# Patient Record
Sex: FEMALE | Race: WHITE | NOT HISPANIC OR LATINO | URBAN - METROPOLITAN AREA
[De-identification: names, ages, dates, MRNs, and addresses within clinical notes are randomized per-mention and may not be internally consistent; named-entity substitution may affect disease eponyms.]

---

## 2017-03-29 ENCOUNTER — OUTPATIENT (OUTPATIENT)
Dept: OUTPATIENT SERVICES | Facility: HOSPITAL | Age: 82
LOS: 1 days | End: 2017-03-29
Payer: MEDICARE

## 2017-03-29 DIAGNOSIS — Z95.1 PRESENCE OF AORTOCORONARY BYPASS GRAFT: Chronic | ICD-10-CM

## 2017-03-29 DIAGNOSIS — Z95.4 PRESENCE OF OTHER HEART-VALVE REPLACEMENT: Chronic | ICD-10-CM

## 2017-03-29 DIAGNOSIS — Z98.89 OTHER SPECIFIED POSTPROCEDURAL STATES: Chronic | ICD-10-CM

## 2017-03-29 DIAGNOSIS — Z98.49 CATARACT EXTRACTION STATUS, UNSPECIFIED EYE: Chronic | ICD-10-CM

## 2017-03-29 DIAGNOSIS — H26.492 OTHER SECONDARY CATARACT, LEFT EYE: ICD-10-CM

## 2017-03-29 PROCEDURE — 66821 AFTER CATARACT LASER SURGERY: CPT | Mod: LT

## 2017-04-20 ENCOUNTER — APPOINTMENT (OUTPATIENT)
Dept: CARDIOLOGY | Facility: CLINIC | Age: 82
End: 2017-04-20

## 2017-04-20 ENCOUNTER — NON-APPOINTMENT (OUTPATIENT)
Age: 82
End: 2017-04-20

## 2017-04-20 VITALS
DIASTOLIC BLOOD PRESSURE: 96 MMHG | HEART RATE: 69 BPM | HEIGHT: 62.5 IN | SYSTOLIC BLOOD PRESSURE: 191 MMHG | OXYGEN SATURATION: 96 %

## 2017-05-07 ENCOUNTER — RESULT CHARGE (OUTPATIENT)
Age: 82
End: 2017-05-07

## 2017-05-08 ENCOUNTER — APPOINTMENT (OUTPATIENT)
Dept: INTERNAL MEDICINE | Facility: CLINIC | Age: 82
End: 2017-05-08

## 2017-05-08 ENCOUNTER — INPATIENT (INPATIENT)
Facility: HOSPITAL | Age: 82
LOS: 1 days | Discharge: HOME | DRG: 312 | End: 2017-05-10
Attending: HOSPITALIST | Admitting: HOSPITALIST
Payer: MEDICARE

## 2017-05-08 ENCOUNTER — NON-APPOINTMENT (OUTPATIENT)
Age: 82
End: 2017-05-08

## 2017-05-08 VITALS — HEART RATE: 70 BPM | DIASTOLIC BLOOD PRESSURE: 92 MMHG | SYSTOLIC BLOOD PRESSURE: 170 MMHG | RESPIRATION RATE: 14 BRPM

## 2017-05-08 VITALS
DIASTOLIC BLOOD PRESSURE: 90 MMHG | HEART RATE: 86 BPM | WEIGHT: 194 LBS | HEIGHT: 62.5 IN | SYSTOLIC BLOOD PRESSURE: 130 MMHG | BODY MASS INDEX: 34.81 KG/M2

## 2017-05-08 VITALS
RESPIRATION RATE: 18 BRPM | TEMPERATURE: 98 F | SYSTOLIC BLOOD PRESSURE: 181 MMHG | OXYGEN SATURATION: 98 % | HEART RATE: 92 BPM | DIASTOLIC BLOOD PRESSURE: 79 MMHG

## 2017-05-08 DIAGNOSIS — Z98.89 OTHER SPECIFIED POSTPROCEDURAL STATES: Chronic | ICD-10-CM

## 2017-05-08 DIAGNOSIS — I82.409 ACUTE EMBOLISM AND THROMBOSIS OF UNSPECIFIED DEEP VEINS OF UNSPECIFIED LOWER EXTREMITY: ICD-10-CM

## 2017-05-08 DIAGNOSIS — Z95.1 PRESENCE OF AORTOCORONARY BYPASS GRAFT: Chronic | ICD-10-CM

## 2017-05-08 DIAGNOSIS — Z95.4 PRESENCE OF OTHER HEART-VALVE REPLACEMENT: Chronic | ICD-10-CM

## 2017-05-08 DIAGNOSIS — R55 SYNCOPE AND COLLAPSE: ICD-10-CM

## 2017-05-08 DIAGNOSIS — Z98.49 CATARACT EXTRACTION STATUS, UNSPECIFIED EYE: Chronic | ICD-10-CM

## 2017-05-08 DIAGNOSIS — Z29.9 ENCOUNTER FOR PROPHYLACTIC MEASURES, UNSPECIFIED: ICD-10-CM

## 2017-05-08 DIAGNOSIS — I25.10 ATHEROSCLEROTIC HEART DISEASE OF NATIVE CORONARY ARTERY WITHOUT ANGINA PECTORIS: ICD-10-CM

## 2017-05-08 DIAGNOSIS — I10 ESSENTIAL (PRIMARY) HYPERTENSION: ICD-10-CM

## 2017-05-08 DIAGNOSIS — E78.5 HYPERLIPIDEMIA, UNSPECIFIED: ICD-10-CM

## 2017-05-08 DIAGNOSIS — I26.99 OTHER PULMONARY EMBOLISM WITHOUT ACUTE COR PULMONALE: ICD-10-CM

## 2017-05-08 LAB
ALBUMIN SERPL ELPH-MCNC: 4.3 G/DL — SIGNIFICANT CHANGE UP (ref 3.3–5)
ALP SERPL-CCNC: 77 U/L — SIGNIFICANT CHANGE UP (ref 40–120)
ALT FLD-CCNC: 35 U/L RC — SIGNIFICANT CHANGE UP (ref 10–45)
ANION GAP SERPL CALC-SCNC: 13 MMOL/L — SIGNIFICANT CHANGE UP (ref 5–17)
APTT BLD: 30.4 SEC — SIGNIFICANT CHANGE UP (ref 27.5–37.4)
AST SERPL-CCNC: 26 U/L — SIGNIFICANT CHANGE UP (ref 10–40)
BASOPHILS # BLD AUTO: 0 K/UL — SIGNIFICANT CHANGE UP (ref 0–0.2)
BASOPHILS NFR BLD AUTO: 0.2 % — SIGNIFICANT CHANGE UP (ref 0–2)
BILIRUB SERPL-MCNC: 0.5 MG/DL — SIGNIFICANT CHANGE UP (ref 0.2–1.2)
BUN SERPL-MCNC: 19 MG/DL — SIGNIFICANT CHANGE UP (ref 7–23)
CALCIUM SERPL-MCNC: 10.3 MG/DL — SIGNIFICANT CHANGE UP (ref 8.4–10.5)
CHLORIDE SERPL-SCNC: 104 MMOL/L — SIGNIFICANT CHANGE UP (ref 96–108)
CK MB BLD-MCNC: 5.7 % — HIGH (ref 0–3.5)
CK MB CFR SERPL CALC: 5.5 NG/ML — HIGH (ref 0–3.8)
CK SERPL-CCNC: 97 U/L — SIGNIFICANT CHANGE UP (ref 25–170)
CO2 SERPL-SCNC: 28 MMOL/L — SIGNIFICANT CHANGE UP (ref 22–31)
CREAT SERPL-MCNC: 0.88 MG/DL — SIGNIFICANT CHANGE UP (ref 0.5–1.3)
EOSINOPHIL # BLD AUTO: 0.2 K/UL — SIGNIFICANT CHANGE UP (ref 0–0.5)
EOSINOPHIL NFR BLD AUTO: 2.1 % — SIGNIFICANT CHANGE UP (ref 0–6)
GLUCOSE SERPL-MCNC: 94 MG/DL — SIGNIFICANT CHANGE UP (ref 70–99)
HCT VFR BLD CALC: 42.1 % — SIGNIFICANT CHANGE UP (ref 34.5–45)
HGB BLD-MCNC: 14.3 G/DL — SIGNIFICANT CHANGE UP (ref 11.5–15.5)
INR BLD: 1.05 RATIO — SIGNIFICANT CHANGE UP (ref 0.88–1.16)
LYMPHOCYTES # BLD AUTO: 1.6 K/UL — SIGNIFICANT CHANGE UP (ref 1–3.3)
LYMPHOCYTES # BLD AUTO: 19.6 % — SIGNIFICANT CHANGE UP (ref 13–44)
MCHC RBC-ENTMCNC: 32.1 PG — SIGNIFICANT CHANGE UP (ref 27–34)
MCHC RBC-ENTMCNC: 33.9 GM/DL — SIGNIFICANT CHANGE UP (ref 32–36)
MCV RBC AUTO: 94.5 FL — SIGNIFICANT CHANGE UP (ref 80–100)
MONOCYTES # BLD AUTO: 0.5 K/UL — SIGNIFICANT CHANGE UP (ref 0–0.9)
MONOCYTES NFR BLD AUTO: 6.5 % — SIGNIFICANT CHANGE UP (ref 2–14)
NEUTROPHILS # BLD AUTO: 5.9 K/UL — SIGNIFICANT CHANGE UP (ref 1.8–7.4)
NEUTROPHILS NFR BLD AUTO: 71.6 % — SIGNIFICANT CHANGE UP (ref 43–77)
NT-PROBNP SERPL-SCNC: 323 PG/ML — HIGH (ref 0–300)
PLATELET # BLD AUTO: 198 K/UL — SIGNIFICANT CHANGE UP (ref 150–400)
POTASSIUM SERPL-MCNC: 4.3 MMOL/L — SIGNIFICANT CHANGE UP (ref 3.5–5.3)
POTASSIUM SERPL-SCNC: 4.3 MMOL/L — SIGNIFICANT CHANGE UP (ref 3.5–5.3)
PROT SERPL-MCNC: 7.7 G/DL — SIGNIFICANT CHANGE UP (ref 6–8.3)
PROTHROM AB SERPL-ACNC: 11.5 SEC — SIGNIFICANT CHANGE UP (ref 9.8–12.7)
RBC # BLD: 4.46 M/UL — SIGNIFICANT CHANGE UP (ref 3.8–5.2)
RBC # FLD: 12.4 % — SIGNIFICANT CHANGE UP (ref 10.3–14.5)
SODIUM SERPL-SCNC: 145 MMOL/L — SIGNIFICANT CHANGE UP (ref 135–145)
TROPONIN T SERPL-MCNC: <0.01 NG/ML — SIGNIFICANT CHANGE UP (ref 0–0.06)
WBC # BLD: 8.2 K/UL — SIGNIFICANT CHANGE UP (ref 3.8–10.5)
WBC # FLD AUTO: 8.2 K/UL — SIGNIFICANT CHANGE UP (ref 3.8–10.5)

## 2017-05-08 PROCEDURE — 99223 1ST HOSP IP/OBS HIGH 75: CPT

## 2017-05-08 PROCEDURE — 99285 EMERGENCY DEPT VISIT HI MDM: CPT | Mod: GC

## 2017-05-08 PROCEDURE — 71020: CPT | Mod: 26

## 2017-05-08 RX ORDER — LISINOPRIL 2.5 MG/1
5 TABLET ORAL DAILY
Qty: 0 | Refills: 0 | Status: DISCONTINUED | OUTPATIENT
Start: 2017-05-08 | End: 2017-05-10

## 2017-05-08 RX ORDER — HEPARIN SODIUM 5000 [USP'U]/ML
5000 INJECTION INTRAVENOUS; SUBCUTANEOUS EVERY 8 HOURS
Qty: 0 | Refills: 0 | Status: DISCONTINUED | OUTPATIENT
Start: 2017-05-08 | End: 2017-05-10

## 2017-05-08 RX ORDER — ASPIRIN/CALCIUM CARB/MAGNESIUM 324 MG
1 TABLET ORAL
Qty: 0 | Refills: 0 | COMMUNITY

## 2017-05-08 RX ORDER — ASPIRIN/CALCIUM CARB/MAGNESIUM 324 MG
325 TABLET ORAL DAILY
Qty: 0 | Refills: 0 | Status: DISCONTINUED | OUTPATIENT
Start: 2017-05-08 | End: 2017-05-10

## 2017-05-08 RX ORDER — METOPROLOL TARTRATE 50 MG
1 TABLET ORAL
Qty: 0 | Refills: 0 | COMMUNITY

## 2017-05-08 NOTE — H&P ADULT. - PROBLEM SELECTOR PLAN 1
Differential includes but not limited to: arrhythmia (i.e conduction block) vs valvular insufficiency/stenosis vs ventricular dysfunction vs PE (low suspicion as patient highly ambulatory denies CP, pleuritic CP not hypoxic nor tachycardic and EKG changes are chronic) vs neurologic (low suspicion as patient without focal neurologic deficits on exam nor report of seizure like activity)  Check orthostatics  Monitor on tele eval for arrhythmia: per cardiology consider conduction block given RBBB and 1st degree AV block or ventricular arrhythmia   Trend CE, TSH, Lipid Panel  Check TTE  Consider Carotid dopplers as patient with report of ?carotid stenosis on right side   Per cardiology check LE doppler to R/O DVT given LE edema and concern for PE given S1Q3T3 on EKG - Patient refuses CTA of chest at the time: patient understands purpose of study and accepts risks of not undergoing the study. Daughter at bedside Differential includes but not limited to: arrhythmia (i.e conduction block) vs valvular insufficiency/stenosis vs ventricular dysfunction vs PE (low suspicion as patient highly ambulatory denies CP, pleuritic CP not hypoxic nor tachycardic and EKG changes are chronic) vs neurologic (low suspicion as patient without focal neurologic deficits on exam nor report of seizure like activity)  Monitor on tele eval for arrhythmia: per cardiology consider conduction block given RBBB and 1st degree AV block or ventricular arrhythmia   Check orthostatics  Trend CE, TSH, Lipid Panel  Check TTE  Consider Carotid dopplers as patient with report of ?carotid stenosis on right side   Per cardiology, check LE doppler to R/O DVT given LE edema and concern for PE given S1Q3T3 on EKG - Patient refuses CTA of chest at the time: patient understands purpose of study and accepts risks of not undergoing the study. Daughter at bedside  Per cardiology, hold AVN blockers

## 2017-05-08 NOTE — H&P ADULT. - ATTENDING COMMENTS
Patient previously unknown to me and I was assigned case. Primary day team to assume care in the AM. Sign out provided to night NP

## 2017-05-08 NOTE — ED PROVIDER NOTE - MEDICAL DECISION MAKING DETAILS
Gil: 85 year old female with syncope x 2 with no preceding symptoms. will get labs, ekg, cxr, admit to hospital for further eval. Patient cardiologist Dr. Johns and consulted.

## 2017-05-08 NOTE — ED ADULT NURSE REASSESSMENT NOTE - NS ED NURSE REASSESS COMMENT FT1
Received report from KWESI Waller at 1900. pt A&Ox3. Safety checked, pt eating, in no acute distress, denies pain/discomfort. Pt encouraged to call for assist when needed. Safety and comfort provided/maintained. Admitted awaiting bed assignment.

## 2017-05-08 NOTE — H&P ADULT. - PROBLEM SELECTOR PLAN 2
Elevated BP noted on vitals  Patient took home metoprolol dose today. Elevated BP noted on vitals  Patient took home metoprolol dose today.  Per cardiology: hold AV reynold blockers in setting of syncope  For HTN control: will initiate ACE inhibitor  Primary day team to f/u with cardiology with appropriate regimen

## 2017-05-08 NOTE — H&P ADULT. - RS GEN PE MLT RESP DETAILS PC
respirations non-labored/good air movement/airway patent/breath sounds equal/clear to auscultation bilaterally/no wheezes no rales/respirations non-labored/good air movement/breath sounds equal/clear to auscultation bilaterally/airway patent/no wheezes

## 2017-05-08 NOTE — ED ADULT NURSE NOTE - PSH
H/O: Hysterectomy    History of Appendectomy    History of Tonsillectomy    S/P AVR (aortic valve replacement)    S/P CABG (coronary artery bypass graft)    S/P right knee arthroscopy    Status post cataract extraction  OD

## 2017-05-08 NOTE — ED ADULT NURSE NOTE - OBJECTIVE STATEMENT
Female 85 years old with history of HTN and CAD was sent by cardiologist for syncope. Pt stated she felt dizzy and nauseous and passed out twice yesterday. +sob on exertion today but denies chest pain, fever and chills. +nausea but no vomiting/diarrhea. Denies urinary symptoms. EKG completed. Labs obtained. Will monitor.

## 2017-05-08 NOTE — ED PROVIDER NOTE - OBJECTIVE STATEMENT
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 pt with syncopal episode while driving, unwitnessed, unconscious for brief period, palpitations following. no cp or sob. yesterday with similar episode while sitting in restaurant (lasted ~30 seconds). no bowel incontinence, ? urinary incontinence. no tongue biting. no post-ictal period. both episodes occurred without taking metoprolol. no recent cp or sob. no h/o syncopal episodes. seen by pmd, noted to have RBBB, sent to ER. no n/v/d. no fever or chills. currently complains of some lightheadedness.    cards: tay  pmd: Kaitlin Alvarez

## 2017-05-08 NOTE — H&P ADULT. - HISTORY OF PRESENT ILLNESS
Yohan, 84 yo woman with PMH of HTN,  CAD, s/p CABG and Aortic Valve Replacement (bioprosthetic) presenting with episode of syncope one day prior to admission. Patient was with a friend having breakfast at a diner: prior to having breakfast, while in the sitting position patient had a syncopal episode prior having breakfast lasting ~ 20 seconds. LOC was witnessed: no appreciable seizure-like activity, no trauma, no reported confusion afterwards. Patient denies symptoms of: dizziness, lightheadedness, nausea, visual disturbance, HA, extremity weakness, numbness. Denies associated CP, pleuritic CP, SOB, worsening LE edema. Per daughter, patient had reported episode of unwitnessed ?syncope. Per daughter, patient was observed by another , possibly drifting to one side of the chair. Patient was on  side, but in traffic and vehicle not moving. Patient denies loss of consciousness, but endorses lightheadedness but thinks it was 2/2 to the warm weather. Patient denies fevers, chills, sweats, cough, dysuria, increased frequency. Does endorse stress incontinence. Daughter concerned about patient's sleep pattern (lack of sleep at home) Yohan, 86 yo woman with PMH of HTN,  CAD, s/p CABG and Aortic Valve Replacement (bioprosthetic) presenting with episode of syncope one day prior to admission. Patient was with a friend having breakfast at a diner: prior to having breakfast, while in the sitting position patient had a syncopal episode prior having breakfast lasting ~ 20 seconds. LOC was witnessed: no appreciable seizure-like activity, no trauma, no reported confusion afterwards. Patient denies symptoms of: dizziness, lightheadedness, nausea, visual disturbance, HA, extremity weakness, numbness. Denies associated CP, pleuritic CP, SOB, worsening LE edema. Per daughter, patient had reported episode of unwitnessed ?syncope. Per daughter, patient was observed by another , possibly drifting to one side of the chair. Patient was on  side, but in traffic and vehicle not moving. Patient denies loss of consciousness, but endorses lightheadedness but thinks it was 2/2 to the warm weather. Patient denies fevers, chills, sweats, cough, dysuria, increased frequency. Endorse allergy symptoms (runny nose and watery eyes) Does endorse stress incontinence. Daughter concerned about patient's sleep pattern (lack of sleep at home)

## 2017-05-08 NOTE — H&P ADULT. - ASSESSMENT
86 yo woman with PMH of HTN,  CAD, s/p CABG and Aortic Valve Replacement (bioprosthetic) presenting with of syncope.

## 2017-05-09 LAB
ANION GAP SERPL CALC-SCNC: 13 MMOL/L — SIGNIFICANT CHANGE UP (ref 5–17)
BUN SERPL-MCNC: 17 MG/DL — SIGNIFICANT CHANGE UP (ref 7–23)
CALCIUM SERPL-MCNC: 9.7 MG/DL — SIGNIFICANT CHANGE UP (ref 8.4–10.5)
CHLORIDE SERPL-SCNC: 105 MMOL/L — SIGNIFICANT CHANGE UP (ref 96–108)
CHOLEST SERPL-MCNC: 183 MG/DL — SIGNIFICANT CHANGE UP (ref 10–199)
CK MB CFR SERPL CALC: 3.5 NG/ML — SIGNIFICANT CHANGE UP (ref 0–3.8)
CK MB CFR SERPL CALC: 4.1 NG/ML — HIGH (ref 0–3.8)
CK SERPL-CCNC: 67 U/L — SIGNIFICANT CHANGE UP (ref 25–170)
CK SERPL-CCNC: 76 U/L — SIGNIFICANT CHANGE UP (ref 25–170)
CO2 SERPL-SCNC: 25 MMOL/L — SIGNIFICANT CHANGE UP (ref 22–31)
CREAT SERPL-MCNC: 0.81 MG/DL — SIGNIFICANT CHANGE UP (ref 0.5–1.3)
GLUCOSE SERPL-MCNC: 110 MG/DL — HIGH (ref 70–99)
HDLC SERPL-MCNC: 72 MG/DL — SIGNIFICANT CHANGE UP (ref 40–125)
LIPID PNL WITH DIRECT LDL SERPL: 92 MG/DL — SIGNIFICANT CHANGE UP
MAGNESIUM SERPL-MCNC: 2 MG/DL — SIGNIFICANT CHANGE UP (ref 1.6–2.6)
PHOSPHATE SERPL-MCNC: 3.3 MG/DL — SIGNIFICANT CHANGE UP (ref 2.5–4.5)
POTASSIUM SERPL-MCNC: 4.3 MMOL/L — SIGNIFICANT CHANGE UP (ref 3.5–5.3)
POTASSIUM SERPL-SCNC: 4.3 MMOL/L — SIGNIFICANT CHANGE UP (ref 3.5–5.3)
SODIUM SERPL-SCNC: 143 MMOL/L — SIGNIFICANT CHANGE UP (ref 135–145)
TOTAL CHOLESTEROL/HDL RATIO MEASUREMENT: 3 RATIO — LOW (ref 3.3–7.1)
TRIGL SERPL-MCNC: 94 MG/DL — SIGNIFICANT CHANGE UP (ref 10–149)
TROPONIN T SERPL-MCNC: <0.01 NG/ML — SIGNIFICANT CHANGE UP (ref 0–0.06)
TROPONIN T SERPL-MCNC: <0.01 NG/ML — SIGNIFICANT CHANGE UP (ref 0–0.06)
TSH SERPL-MCNC: 2.96 UIU/ML — SIGNIFICANT CHANGE UP (ref 0.27–4.2)

## 2017-05-09 PROCEDURE — 93010 ELECTROCARDIOGRAM REPORT: CPT

## 2017-05-09 PROCEDURE — 99233 SBSQ HOSP IP/OBS HIGH 50: CPT

## 2017-05-09 PROCEDURE — 93970 EXTREMITY STUDY: CPT | Mod: 26

## 2017-05-09 PROCEDURE — 93306 TTE W/DOPPLER COMPLETE: CPT | Mod: 26

## 2017-05-09 PROCEDURE — 93880 EXTRACRANIAL BILAT STUDY: CPT | Mod: 26

## 2017-05-09 RX ORDER — SENNA PLUS 8.6 MG/1
2 TABLET ORAL AT BEDTIME
Qty: 0 | Refills: 0 | Status: DISCONTINUED | OUTPATIENT
Start: 2017-05-09 | End: 2017-05-10

## 2017-05-09 RX ORDER — DOCUSATE SODIUM 100 MG
100 CAPSULE ORAL THREE TIMES A DAY
Qty: 0 | Refills: 0 | Status: DISCONTINUED | OUTPATIENT
Start: 2017-05-09 | End: 2017-05-10

## 2017-05-09 RX ADMIN — HEPARIN SODIUM 5000 UNIT(S): 5000 INJECTION INTRAVENOUS; SUBCUTANEOUS at 21:00

## 2017-05-09 RX ADMIN — Medication 100 MILLIGRAM(S): at 21:01

## 2017-05-09 RX ADMIN — HEPARIN SODIUM 5000 UNIT(S): 5000 INJECTION INTRAVENOUS; SUBCUTANEOUS at 01:22

## 2017-05-09 RX ADMIN — HEPARIN SODIUM 5000 UNIT(S): 5000 INJECTION INTRAVENOUS; SUBCUTANEOUS at 08:21

## 2017-05-09 RX ADMIN — Medication 325 MILLIGRAM(S): at 08:22

## 2017-05-09 RX ADMIN — LISINOPRIL 5 MILLIGRAM(S): 2.5 TABLET ORAL at 06:02

## 2017-05-09 NOTE — PROVIDER CONTACT NOTE (OTHER) - ACTION/TREATMENT ORDERED:
NP aware & at patient's bedside assessing patient. NP ordered 12-Lead ECG, complete Labs: Cardiac Enzymes & O2 NC 2 Liters. O2 NC 2 Liters placed on. No additional orders as per NP.

## 2017-05-09 NOTE — PROVIDER CONTACT NOTE (OTHER) - ASSESSMENT
Patient complaining of non-radiating midsternal chest pain rating the pain a 5/10 and describing the pain as pressure. Patient states that the precipitating factors are unknown. Patient denies shortness of breath. Patient's Vital Signs: Temperature 98 F oral, Heart Rate 80, Blood Pressure 177/83, Respiratory Rate 18 and O2 Saturation 95% Room Air. Patient's Heart Rhythm is Normal Sinus Rhythm on the cardiac monitor. Patient states that her chest pain resolved. Patient has an active order for Labs: Cardiac Enzymes.

## 2017-05-09 NOTE — PROVIDER CONTACT NOTE (OTHER) - BACKGROUND
Patient admitted for Syncope and Collapse, Suspected Deep Vein Thrombosis, Suspected Pulmonary Embolism, Hyperlipidemia, Coronary Artery Disease, Hypertension.

## 2017-05-10 ENCOUNTER — TRANSCRIPTION ENCOUNTER (OUTPATIENT)
Age: 82
End: 2017-05-10

## 2017-05-10 VITALS — OXYGEN SATURATION: 94 % | RESPIRATION RATE: 17 BRPM | TEMPERATURE: 98 F

## 2017-05-10 PROCEDURE — 80061 LIPID PANEL: CPT

## 2017-05-10 PROCEDURE — 82550 ASSAY OF CK (CPK): CPT

## 2017-05-10 PROCEDURE — 93880 EXTRACRANIAL BILAT STUDY: CPT

## 2017-05-10 PROCEDURE — 99223 1ST HOSP IP/OBS HIGH 75: CPT

## 2017-05-10 PROCEDURE — 85730 THROMBOPLASTIN TIME PARTIAL: CPT

## 2017-05-10 PROCEDURE — 80053 COMPREHEN METABOLIC PANEL: CPT

## 2017-05-10 PROCEDURE — 93005 ELECTROCARDIOGRAM TRACING: CPT

## 2017-05-10 PROCEDURE — 85610 PROTHROMBIN TIME: CPT

## 2017-05-10 PROCEDURE — 83735 ASSAY OF MAGNESIUM: CPT

## 2017-05-10 PROCEDURE — 93306 TTE W/DOPPLER COMPLETE: CPT

## 2017-05-10 PROCEDURE — 71046 X-RAY EXAM CHEST 2 VIEWS: CPT

## 2017-05-10 PROCEDURE — 83880 ASSAY OF NATRIURETIC PEPTIDE: CPT

## 2017-05-10 PROCEDURE — 99285 EMERGENCY DEPT VISIT HI MDM: CPT | Mod: 25

## 2017-05-10 PROCEDURE — 84484 ASSAY OF TROPONIN QUANT: CPT

## 2017-05-10 PROCEDURE — 80048 BASIC METABOLIC PNL TOTAL CA: CPT

## 2017-05-10 PROCEDURE — 84443 ASSAY THYROID STIM HORMONE: CPT

## 2017-05-10 PROCEDURE — 85027 COMPLETE CBC AUTOMATED: CPT

## 2017-05-10 PROCEDURE — 82553 CREATINE MB FRACTION: CPT

## 2017-05-10 PROCEDURE — 84100 ASSAY OF PHOSPHORUS: CPT

## 2017-05-10 PROCEDURE — 93970 EXTREMITY STUDY: CPT

## 2017-05-10 RX ORDER — LISINOPRIL 2.5 MG/1
1 TABLET ORAL
Qty: 30 | Refills: 0 | OUTPATIENT
Start: 2017-05-10 | End: 2017-06-09

## 2017-05-10 RX ORDER — METOPROLOL TARTRATE 50 MG
50 TABLET ORAL
Qty: 0 | Refills: 0 | Status: DISCONTINUED | OUTPATIENT
Start: 2017-05-10 | End: 2017-05-10

## 2017-05-10 RX ADMIN — Medication 325 MILLIGRAM(S): at 12:14

## 2017-05-10 RX ADMIN — LISINOPRIL 5 MILLIGRAM(S): 2.5 TABLET ORAL at 06:03

## 2017-05-10 RX ADMIN — Medication 100 MILLIGRAM(S): at 13:25

## 2017-05-10 RX ADMIN — HEPARIN SODIUM 5000 UNIT(S): 5000 INJECTION INTRAVENOUS; SUBCUTANEOUS at 13:25

## 2017-05-10 RX ADMIN — Medication 100 MILLIGRAM(S): at 06:03

## 2017-05-10 RX ADMIN — Medication 50 MILLIGRAM(S): at 12:13

## 2017-05-10 NOTE — DISCHARGE NOTE ADULT - CARE PROVIDERS DIRECT ADDRESSES
[email or web address]

## 2017-05-10 NOTE — DISCHARGE NOTE ADULT - PATIENT PORTAL LINK FT
“You can access the FollowHealth Patient Portal, offered by [facility], by registering with the following website: [email or web address]”

## 2017-05-10 NOTE — DISCHARGE NOTE ADULT - MEDICATION SUMMARY - MEDICATIONS TO TAKE
I will START or STAY ON the medications listed below when I get home from the hospital:    Aspirin Enteric Coated 325 mg oral delayed release tablet  -- 1 tab(s) by mouth once a day  -- Indication: For CAD (coronary artery disease)    lisinopril 5 mg oral tablet  -- 1 tab(s) by mouth once a day  -- Indication: For Hypertension    metoprolol tartrate 50 mg oral tablet  -- 1 tab(s) by mouth 2 times a day  -- Indication: For Hypertension

## 2017-05-10 NOTE — DISCHARGE NOTE ADULT - PLAN OF CARE
prevention Please follow up with Dr. Johns as outpt  Cont to take lopressor as prescribed.  If any symptoms of dizziness, palpitations, syncope please notify your doctor or return to hospital. Please follow up with Dr. Clay as outpt for MRI/MRA (open) Cont with lopressor.   Start taking lisinopril as outpt and follow up BP with Dr. Clay Cont diet control

## 2017-05-10 NOTE — DISCHARGE NOTE ADULT - CARE PROVIDER_API CALL
Kaitlin Clay), Internal Medicine  [address] [place] [address]  Phone: [phone]  Fax: [phone]    Chad Johns), Cardiovascular Disease; Interventional Cardiology  [address] [place] [id number]  Phone: [phone]  Fax: [phone]

## 2017-05-10 NOTE — DISCHARGE NOTE ADULT - CARE PLAN
Principal Discharge DX:	Syncope and collapse  Goal:	prevention  Instructions for follow-up, activity and diet:	Please follow up with Dr. Johns as outpt  Cont to take lopressor as prescribed.  If any symptoms of dizziness, palpitations, syncope please notify your doctor or return to hospital.  Secondary Diagnosis:	Vertebral artery disease  Instructions for follow-up, activity and diet:	Please follow up with Dr. Clay as outpt for MRI/MRA (open)  Secondary Diagnosis:	Essential hypertension  Instructions for follow-up, activity and diet:	Cont with lopressor.   Start taking lisinopril as outpt and follow up BP with Dr. Clay  Secondary Diagnosis:	Hyperlipidemia, unspecified hyperlipidemia type  Instructions for follow-up, activity and diet:	Cont diet control

## 2017-05-10 NOTE — DISCHARGE NOTE ADULT - HOSPITAL COURSE
84 yo woman with PMH of HTN,  CAD, s/p CABG and Aortic Valve Replacement (bioprosthetic) presenting with episode of syncope one day prior to admission. Patient was with a friend having breakfast at a diner: prior to having breakfast, while in the sitting position patient had a syncopal episode prior having breakfast lasting ~ 20 seconds. LOC was witnessed: no appreciable seizure-like activity, no trauma, no reported confusion.     patient was admitted to [facility] for monitor without acute event. Mandy and ECG were negative. Patient was seen by EP/card with recommendation for EPS however patient refused. Patient was explained risk of possible undiagnosed arrythmia but she elected not to pursue further inpt. Lopressor was initially held but resumed. ECho showed mod-severe LVOT but patient refused further workup at this time.  Carotid doppler was also performed showing unilateral vertebral aa increased pressure suggestive of downtract obstruction. Patient was seen by vasc surg and recommendation of CTA of head and neck. However, patient refused any contrast study. patient was asked to follow up with Dr. Clay for possible (open) MRI/MRA as outpt. This was discussed with Dr. Clay over the phone.

## 2017-05-15 ENCOUNTER — MESSAGE (OUTPATIENT)
Age: 82
End: 2017-05-15

## 2017-05-22 ENCOUNTER — OUTPATIENT (OUTPATIENT)
Dept: OUTPATIENT SERVICES | Facility: HOSPITAL | Age: 82
LOS: 1 days | End: 2017-05-22
Payer: MEDICARE

## 2017-05-22 DIAGNOSIS — H26.491 OTHER SECONDARY CATARACT, RIGHT EYE: ICD-10-CM

## 2017-05-22 DIAGNOSIS — Z95.1 PRESENCE OF AORTOCORONARY BYPASS GRAFT: Chronic | ICD-10-CM

## 2017-05-22 DIAGNOSIS — Z98.89 OTHER SPECIFIED POSTPROCEDURAL STATES: Chronic | ICD-10-CM

## 2017-05-22 DIAGNOSIS — Z98.49 CATARACT EXTRACTION STATUS, UNSPECIFIED EYE: Chronic | ICD-10-CM

## 2017-05-22 DIAGNOSIS — Z95.4 PRESENCE OF OTHER HEART-VALVE REPLACEMENT: Chronic | ICD-10-CM

## 2017-05-22 PROCEDURE — 66821 AFTER CATARACT LASER SURGERY: CPT | Mod: RT

## 2017-05-24 ENCOUNTER — APPOINTMENT (OUTPATIENT)
Dept: INTERNAL MEDICINE | Facility: CLINIC | Age: 82
End: 2017-05-24

## 2017-05-24 VITALS
BODY MASS INDEX: 35.16 KG/M2 | WEIGHT: 196 LBS | DIASTOLIC BLOOD PRESSURE: 78 MMHG | SYSTOLIC BLOOD PRESSURE: 142 MMHG | HEIGHT: 62.5 IN

## 2017-05-24 DIAGNOSIS — R55 SYNCOPE AND COLLAPSE: ICD-10-CM

## 2017-05-24 LAB
ANION GAP SERPL CALC-SCNC: 13 MMOL/L
BUN SERPL-MCNC: 24 MG/DL
CALCIUM SERPL-MCNC: 9.5 MG/DL
CHLORIDE SERPL-SCNC: 104 MMOL/L
CO2 SERPL-SCNC: 27 MMOL/L
CREAT SERPL-MCNC: 0.9 MG/DL
GLUCOSE SERPL-MCNC: 86 MG/DL
POTASSIUM SERPL-SCNC: 5.1 MMOL/L
SODIUM SERPL-SCNC: 144 MMOL/L

## 2017-06-16 ENCOUNTER — APPOINTMENT (OUTPATIENT)
Dept: CARDIOLOGY | Facility: CLINIC | Age: 82
End: 2017-06-16

## 2017-06-16 VITALS
DIASTOLIC BLOOD PRESSURE: 83 MMHG | HEIGHT: 62.5 IN | OXYGEN SATURATION: 94 % | HEART RATE: 66 BPM | SYSTOLIC BLOOD PRESSURE: 165 MMHG

## 2017-06-16 DIAGNOSIS — Z79.899 OTHER LONG TERM (CURRENT) DRUG THERAPY: ICD-10-CM

## 2017-06-16 DIAGNOSIS — E66.9 OBESITY, UNSPECIFIED: ICD-10-CM

## 2017-06-16 DIAGNOSIS — R06.02 SHORTNESS OF BREATH: ICD-10-CM

## 2017-06-19 ENCOUNTER — NON-APPOINTMENT (OUTPATIENT)
Age: 82
End: 2017-06-19

## 2017-08-22 ENCOUNTER — APPOINTMENT (OUTPATIENT)
Dept: CARDIOLOGY | Facility: CLINIC | Age: 82
End: 2017-08-22
Payer: MEDICARE

## 2017-08-22 VITALS
BODY MASS INDEX: 34.2 KG/M2 | SYSTOLIC BLOOD PRESSURE: 179 MMHG | DIASTOLIC BLOOD PRESSURE: 49 MMHG | OXYGEN SATURATION: 94 % | HEART RATE: 59 BPM | WEIGHT: 190 LBS

## 2017-08-22 DIAGNOSIS — I45.10 UNSPECIFIED RIGHT BUNDLE-BRANCH BLOCK: ICD-10-CM

## 2017-08-22 PROCEDURE — 93000 ELECTROCARDIOGRAM COMPLETE: CPT

## 2017-08-22 PROCEDURE — 99215 OFFICE O/P EST HI 40 MIN: CPT

## 2017-08-24 ENCOUNTER — NON-APPOINTMENT (OUTPATIENT)
Age: 82
End: 2017-08-24

## 2017-08-30 ENCOUNTER — APPOINTMENT (OUTPATIENT)
Dept: CARDIOLOGY | Facility: CLINIC | Age: 82
End: 2017-08-30
Payer: MEDICARE

## 2017-08-30 VITALS
HEIGHT: 62.5 IN | SYSTOLIC BLOOD PRESSURE: 110 MMHG | DIASTOLIC BLOOD PRESSURE: 60 MMHG | OXYGEN SATURATION: 96 % | HEART RATE: 63 BPM

## 2017-08-30 PROCEDURE — 99214 OFFICE O/P EST MOD 30 MIN: CPT

## 2017-08-30 RX ORDER — AMMONIUM LACTATE 12 %
12 CREAM (GRAM) TOPICAL TWICE DAILY
Qty: 1 | Refills: 5 | Status: ACTIVE | COMMUNITY
Start: 2017-08-30 | End: 1900-01-01

## 2017-09-07 ENCOUNTER — OUTPATIENT (OUTPATIENT)
Dept: OUTPATIENT SERVICES | Facility: HOSPITAL | Age: 82
LOS: 1 days | End: 2017-09-07

## 2017-09-07 ENCOUNTER — APPOINTMENT (OUTPATIENT)
Dept: CV DIAGNOSITCS | Facility: HOSPITAL | Age: 82
End: 2017-09-07

## 2017-09-07 DIAGNOSIS — Q24.8 OTHER SPECIFIED CONGENITAL MALFORMATIONS OF HEART: ICD-10-CM

## 2017-09-07 DIAGNOSIS — Z95.4 PRESENCE OF OTHER HEART-VALVE REPLACEMENT: Chronic | ICD-10-CM

## 2017-09-07 DIAGNOSIS — Z98.49 CATARACT EXTRACTION STATUS, UNSPECIFIED EYE: Chronic | ICD-10-CM

## 2017-09-07 DIAGNOSIS — Z98.89 OTHER SPECIFIED POSTPROCEDURAL STATES: Chronic | ICD-10-CM

## 2017-09-07 DIAGNOSIS — Z95.1 PRESENCE OF AORTOCORONARY BYPASS GRAFT: Chronic | ICD-10-CM

## 2017-09-12 ENCOUNTER — NON-APPOINTMENT (OUTPATIENT)
Age: 82
End: 2017-09-12

## 2017-09-12 ENCOUNTER — APPOINTMENT (OUTPATIENT)
Dept: CARDIOLOGY | Facility: CLINIC | Age: 82
End: 2017-09-12
Payer: MEDICARE

## 2017-09-12 VITALS
OXYGEN SATURATION: 96 % | DIASTOLIC BLOOD PRESSURE: 78 MMHG | HEART RATE: 82 BPM | SYSTOLIC BLOOD PRESSURE: 168 MMHG | HEIGHT: 62.5 IN

## 2017-09-12 PROCEDURE — 99214 OFFICE O/P EST MOD 30 MIN: CPT

## 2017-09-12 PROCEDURE — 93000 ELECTROCARDIOGRAM COMPLETE: CPT

## 2017-09-20 ENCOUNTER — APPOINTMENT (OUTPATIENT)
Dept: ULTRASOUND IMAGING | Facility: HOSPITAL | Age: 82
End: 2017-09-20

## 2017-09-22 ENCOUNTER — APPOINTMENT (OUTPATIENT)
Dept: ULTRASOUND IMAGING | Facility: HOSPITAL | Age: 82
End: 2017-09-22
Payer: MEDICARE

## 2017-09-22 ENCOUNTER — OUTPATIENT (OUTPATIENT)
Dept: OUTPATIENT SERVICES | Facility: HOSPITAL | Age: 82
LOS: 1 days | End: 2017-09-22
Payer: MEDICARE

## 2017-09-22 DIAGNOSIS — Z98.89 OTHER SPECIFIED POSTPROCEDURAL STATES: Chronic | ICD-10-CM

## 2017-09-22 DIAGNOSIS — Z95.4 PRESENCE OF OTHER HEART-VALVE REPLACEMENT: Chronic | ICD-10-CM

## 2017-09-22 DIAGNOSIS — Z95.1 PRESENCE OF AORTOCORONARY BYPASS GRAFT: Chronic | ICD-10-CM

## 2017-09-22 DIAGNOSIS — I82.401 ACUTE EMBOLISM AND THROMBOSIS OF UNSPECIFIED DEEP VEINS OF RIGHT LOWER EXTREMITY: ICD-10-CM

## 2017-09-22 DIAGNOSIS — Z98.49 CATARACT EXTRACTION STATUS, UNSPECIFIED EYE: Chronic | ICD-10-CM

## 2017-09-22 PROCEDURE — 93923 UPR/LXTR ART STDY 3+ LVLS: CPT | Mod: 26

## 2017-09-22 PROCEDURE — 93923 UPR/LXTR ART STDY 3+ LVLS: CPT

## 2017-09-29 ENCOUNTER — APPOINTMENT (OUTPATIENT)
Dept: CARDIOLOGY | Facility: CLINIC | Age: 82
End: 2017-09-29

## 2017-10-04 ENCOUNTER — APPOINTMENT (OUTPATIENT)
Dept: CARDIOLOGY | Facility: CLINIC | Age: 82
End: 2017-10-04
Payer: MEDICARE

## 2017-10-04 VITALS
OXYGEN SATURATION: 96 % | SYSTOLIC BLOOD PRESSURE: 120 MMHG | HEART RATE: 72 BPM | DIASTOLIC BLOOD PRESSURE: 80 MMHG | HEIGHT: 62.5 IN

## 2017-10-04 DIAGNOSIS — M79.671 PAIN IN RIGHT FOOT: ICD-10-CM

## 2017-10-04 DIAGNOSIS — L97.519 NON-PRESSURE CHRONIC ULCER OF OTHER PART OF RIGHT FOOT WITH UNSPECIFIED SEVERITY: ICD-10-CM

## 2017-10-04 PROCEDURE — 99215 OFFICE O/P EST HI 40 MIN: CPT

## 2017-10-06 ENCOUNTER — APPOINTMENT (OUTPATIENT)
Dept: CARDIOLOGY | Facility: CLINIC | Age: 82
End: 2017-10-06

## 2017-10-19 ENCOUNTER — APPOINTMENT (OUTPATIENT)
Dept: CARDIOLOGY | Facility: CLINIC | Age: 82
End: 2017-10-19

## 2017-11-01 ENCOUNTER — APPOINTMENT (OUTPATIENT)
Dept: CARDIOLOGY | Facility: CLINIC | Age: 82
End: 2017-11-01
Payer: MEDICARE

## 2017-11-01 ENCOUNTER — NON-APPOINTMENT (OUTPATIENT)
Age: 82
End: 2017-11-01

## 2017-11-01 VITALS — SYSTOLIC BLOOD PRESSURE: 166 MMHG | DIASTOLIC BLOOD PRESSURE: 81 MMHG

## 2017-11-01 DIAGNOSIS — Z83.1 FAMILY HISTORY OF OTHER INFECTIOUS AND PARASITIC DISEASES: ICD-10-CM

## 2017-11-01 DIAGNOSIS — Z82.49 FAMILY HISTORY OF ISCHEMIC HEART DISEASE AND OTHER DISEASES OF THE CIRCULATORY SYSTEM: ICD-10-CM

## 2017-11-01 PROCEDURE — 99215 OFFICE O/P EST HI 40 MIN: CPT

## 2017-11-01 PROCEDURE — 93000 ELECTROCARDIOGRAM COMPLETE: CPT

## 2017-11-01 RX ORDER — LISINOPRIL 5 MG/1
5 TABLET ORAL DAILY
Qty: 90 | Refills: 3 | Status: DISCONTINUED | COMMUNITY
Start: 2017-05-24 | End: 2017-11-01

## 2017-11-01 RX ORDER — FUROSEMIDE 20 MG/1
20 TABLET ORAL
Qty: 30 | Refills: 0 | Status: COMPLETED | COMMUNITY
Start: 2017-07-13

## 2017-11-01 RX ORDER — ACETAMINOPHEN AND CODEINE 300; 30 MG/1; MG/1
300-30 TABLET ORAL
Qty: 24 | Refills: 0 | Status: COMPLETED | COMMUNITY
Start: 2017-07-01

## 2017-11-01 RX ORDER — FLUOROMETHOLONE 1 MG/ML
0.1 SOLUTION/ DROPS OPHTHALMIC
Qty: 5 | Refills: 0 | Status: DISCONTINUED | COMMUNITY
Start: 2017-01-31 | End: 2017-11-01

## 2017-11-01 RX ORDER — CEFADROXIL 500 MG/1
500 CAPSULE ORAL
Qty: 30 | Refills: 0 | Status: COMPLETED | COMMUNITY
Start: 2017-07-19

## 2017-11-01 RX ORDER — METOPROLOL SUCCINATE 50 MG/1
50 TABLET, EXTENDED RELEASE ORAL
Qty: 90 | Refills: 0 | Status: COMPLETED | COMMUNITY
Start: 2017-06-29

## 2017-11-01 RX ORDER — APIXABAN 2.5 MG/1
2.5 TABLET, FILM COATED ORAL
Qty: 180 | Refills: 3 | Status: ACTIVE | COMMUNITY
Start: 2017-09-02

## 2017-11-01 RX ORDER — CEPHALEXIN 500 MG/1
500 CAPSULE ORAL
Qty: 21 | Refills: 0 | Status: COMPLETED | COMMUNITY
Start: 2017-06-29

## 2017-11-06 ENCOUNTER — NON-APPOINTMENT (OUTPATIENT)
Age: 82
End: 2017-11-06

## 2017-12-19 ENCOUNTER — APPOINTMENT (OUTPATIENT)
Dept: CARDIOLOGY | Facility: CLINIC | Age: 82
End: 2017-12-19
Payer: MEDICARE

## 2017-12-19 VITALS — OXYGEN SATURATION: 97 % | SYSTOLIC BLOOD PRESSURE: 163 MMHG | HEART RATE: 72 BPM | DIASTOLIC BLOOD PRESSURE: 78 MMHG

## 2017-12-19 PROCEDURE — 93000 ELECTROCARDIOGRAM COMPLETE: CPT

## 2017-12-19 PROCEDURE — 99215 OFFICE O/P EST HI 40 MIN: CPT

## 2018-01-02 ENCOUNTER — APPOINTMENT (OUTPATIENT)
Dept: CARDIOLOGY | Facility: CLINIC | Age: 83
End: 2018-01-02

## 2018-01-09 ENCOUNTER — APPOINTMENT (OUTPATIENT)
Dept: CARDIOLOGY | Facility: CLINIC | Age: 83
End: 2018-01-09
Payer: MEDICARE

## 2018-01-09 ENCOUNTER — NON-APPOINTMENT (OUTPATIENT)
Age: 83
End: 2018-01-09

## 2018-01-09 VITALS — OXYGEN SATURATION: 96 % | DIASTOLIC BLOOD PRESSURE: 77 MMHG | SYSTOLIC BLOOD PRESSURE: 156 MMHG | HEART RATE: 66 BPM

## 2018-01-09 PROCEDURE — 93000 ELECTROCARDIOGRAM COMPLETE: CPT

## 2018-01-09 PROCEDURE — 99215 OFFICE O/P EST HI 40 MIN: CPT

## 2018-01-09 RX ORDER — FUROSEMIDE 20 MG/1
20 TABLET ORAL DAILY
Qty: 30 | Refills: 5 | Status: ACTIVE | COMMUNITY
Start: 2017-11-01 | End: 1900-01-01

## 2018-01-11 ENCOUNTER — APPOINTMENT (OUTPATIENT)
Dept: CARDIOLOGY | Facility: CLINIC | Age: 83
End: 2018-01-11
Payer: MEDICARE

## 2018-01-11 VITALS — WEIGHT: 190 LBS | OXYGEN SATURATION: 96 % | BODY MASS INDEX: 34.09 KG/M2 | HEIGHT: 62.5 IN | HEART RATE: 71 BPM

## 2018-01-11 PROCEDURE — 99213 OFFICE O/P EST LOW 20 MIN: CPT

## 2018-02-07 ENCOUNTER — OTHER (OUTPATIENT)
Age: 83
End: 2018-02-07

## 2018-02-14 ENCOUNTER — APPOINTMENT (OUTPATIENT)
Dept: INTERNAL MEDICINE | Facility: CLINIC | Age: 83
End: 2018-02-14
Payer: MEDICARE

## 2018-02-14 VITALS
OXYGEN SATURATION: 97 % | WEIGHT: 187 LBS | HEART RATE: 78 BPM | HEIGHT: 62.5 IN | DIASTOLIC BLOOD PRESSURE: 70 MMHG | BODY MASS INDEX: 33.55 KG/M2 | SYSTOLIC BLOOD PRESSURE: 158 MMHG

## 2018-02-14 DIAGNOSIS — M54.5 LOW BACK PAIN: ICD-10-CM

## 2018-02-14 DIAGNOSIS — N18.3 CHRONIC KIDNEY DISEASE, STAGE 3 (MODERATE): ICD-10-CM

## 2018-02-14 LAB
25(OH)D3 SERPL-MCNC: 31 NG/ML
ALBUMIN SERPL ELPH-MCNC: 4.1 G/DL
ALP BLD-CCNC: 97 U/L
ALT SERPL-CCNC: 21 U/L
ANION GAP SERPL CALC-SCNC: 15 MMOL/L
APTT BLD: 34.2 SEC
AST SERPL-CCNC: 18 U/L
BASOPHILS # BLD AUTO: 0.03 K/UL
BASOPHILS NFR BLD AUTO: 0.4 %
BILIRUB SERPL-MCNC: 0.5 MG/DL
BUN SERPL-MCNC: 43 MG/DL
CALCIUM SERPL-MCNC: 10.2 MG/DL
CHLORIDE SERPL-SCNC: 101 MMOL/L
CHOLEST SERPL-MCNC: 200 MG/DL
CHOLEST/HDLC SERPL: 2.2 RATIO
CO2 SERPL-SCNC: 25 MMOL/L
CREAT SERPL-MCNC: 1.49 MG/DL
EOSINOPHIL # BLD AUTO: 0.13 K/UL
EOSINOPHIL NFR BLD AUTO: 1.6 %
GLUCOSE SERPL-MCNC: 120 MG/DL
HBA1C MFR BLD HPLC: 5.6 %
HCT VFR BLD CALC: 40.6 %
HDLC SERPL-MCNC: 89 MG/DL
HGB BLD-MCNC: 12.7 G/DL
IMM GRANULOCYTES NFR BLD AUTO: 0.4 %
INR PPP: 1.3 RATIO
LDLC SERPL CALC-MCNC: 98 MG/DL
LYMPHOCYTES # BLD AUTO: 2.59 K/UL
LYMPHOCYTES NFR BLD AUTO: 31.8 %
MAN DIFF?: NORMAL
MCHC RBC-ENTMCNC: 29.5 PG
MCHC RBC-ENTMCNC: 31.3 GM/DL
MCV RBC AUTO: 94.4 FL
MONOCYTES # BLD AUTO: 0.9 K/UL
MONOCYTES NFR BLD AUTO: 11 %
NEUTROPHILS # BLD AUTO: 4.47 K/UL
NEUTROPHILS NFR BLD AUTO: 54.8 %
PLATELET # BLD AUTO: 230 K/UL
POTASSIUM SERPL-SCNC: 5.2 MMOL/L
PROT SERPL-MCNC: 6.7 G/DL
PT BLD: 14.8 SEC
RBC # BLD: 4.3 M/UL
RBC # FLD: 15.3 %
SODIUM SERPL-SCNC: 141 MMOL/L
T4 FREE SERPL-MCNC: 1.3 NG/DL
TRIGL SERPL-MCNC: 67 MG/DL
TSH SERPL-ACNC: 3.04 UIU/ML
WBC # FLD AUTO: 8.15 K/UL

## 2018-02-14 PROCEDURE — 99214 OFFICE O/P EST MOD 30 MIN: CPT | Mod: 25

## 2018-02-14 PROCEDURE — G0008: CPT

## 2018-02-14 PROCEDURE — 90688 IIV4 VACCINE SPLT 0.5 ML IM: CPT

## 2018-03-01 ENCOUNTER — RX RENEWAL (OUTPATIENT)
Age: 83
End: 2018-03-01

## 2018-03-01 RX ORDER — METOPROLOL SUCCINATE 100 MG/1
100 TABLET, EXTENDED RELEASE ORAL
Qty: 30 | Refills: 11 | Status: ACTIVE | COMMUNITY
Start: 2017-09-12 | End: 1900-01-01

## 2018-04-24 ENCOUNTER — NON-APPOINTMENT (OUTPATIENT)
Age: 83
End: 2018-04-24

## 2018-04-24 ENCOUNTER — APPOINTMENT (OUTPATIENT)
Dept: CARDIOLOGY | Facility: CLINIC | Age: 83
End: 2018-04-24
Payer: MEDICARE

## 2018-04-24 VITALS — DIASTOLIC BLOOD PRESSURE: 79 MMHG | SYSTOLIC BLOOD PRESSURE: 152 MMHG | HEART RATE: 105 BPM

## 2018-04-24 DIAGNOSIS — I35.0 NONRHEUMATIC AORTIC (VALVE) STENOSIS: ICD-10-CM

## 2018-04-24 DIAGNOSIS — Q24.8 OTHER SPECIFIED CONGENITAL MALFORMATIONS OF HEART: ICD-10-CM

## 2018-04-24 PROCEDURE — 99215 OFFICE O/P EST HI 40 MIN: CPT

## 2018-04-24 PROCEDURE — 93000 ELECTROCARDIOGRAM COMPLETE: CPT

## 2018-07-31 ENCOUNTER — NON-APPOINTMENT (OUTPATIENT)
Age: 83
End: 2018-07-31

## 2018-07-31 ENCOUNTER — APPOINTMENT (OUTPATIENT)
Dept: CARDIOLOGY | Facility: CLINIC | Age: 83
End: 2018-07-31
Payer: MEDICARE

## 2018-07-31 VITALS
SYSTOLIC BLOOD PRESSURE: 132 MMHG | HEART RATE: 69 BPM | DIASTOLIC BLOOD PRESSURE: 76 MMHG | HEIGHT: 62.5 IN | OXYGEN SATURATION: 94 %

## 2018-07-31 PROCEDURE — 99215 OFFICE O/P EST HI 40 MIN: CPT

## 2018-07-31 PROCEDURE — 93000 ELECTROCARDIOGRAM COMPLETE: CPT

## 2018-08-09 ENCOUNTER — APPOINTMENT (OUTPATIENT)
Dept: CARDIOLOGY | Facility: CLINIC | Age: 83
End: 2018-08-09
Payer: MEDICARE

## 2018-08-09 VITALS
SYSTOLIC BLOOD PRESSURE: 173 MMHG | HEART RATE: 79 BPM | DIASTOLIC BLOOD PRESSURE: 78 MMHG | OXYGEN SATURATION: 97 % | BODY MASS INDEX: 34.56 KG/M2 | WEIGHT: 192 LBS

## 2018-08-09 VITALS — SYSTOLIC BLOOD PRESSURE: 152 MMHG | DIASTOLIC BLOOD PRESSURE: 80 MMHG

## 2018-08-09 VITALS — DIASTOLIC BLOOD PRESSURE: 72 MMHG | SYSTOLIC BLOOD PRESSURE: 153 MMHG

## 2018-08-09 DIAGNOSIS — L53.9 ERYTHEMATOUS CONDITION, UNSPECIFIED: ICD-10-CM

## 2018-08-09 PROCEDURE — 99214 OFFICE O/P EST MOD 30 MIN: CPT

## 2018-12-19 ENCOUNTER — NON-APPOINTMENT (OUTPATIENT)
Age: 83
End: 2018-12-19

## 2018-12-19 ENCOUNTER — APPOINTMENT (OUTPATIENT)
Dept: CARDIOLOGY | Facility: CLINIC | Age: 83
End: 2018-12-19
Payer: MEDICARE

## 2018-12-19 VITALS — HEART RATE: 74 BPM | OXYGEN SATURATION: 93 % | DIASTOLIC BLOOD PRESSURE: 76 MMHG | SYSTOLIC BLOOD PRESSURE: 116 MMHG

## 2018-12-19 PROCEDURE — 93000 ELECTROCARDIOGRAM COMPLETE: CPT

## 2018-12-19 PROCEDURE — 99215 OFFICE O/P EST HI 40 MIN: CPT

## 2018-12-21 NOTE — REVIEW OF SYSTEMS
[see HPI] : see HPI [Negative] : Heme/Lymph [Shortness Of Breath] : no shortness of breath [Dyspnea on exertion] : not dyspnea during exertion [Chest Pain] : no chest pain [Lower Ext Edema] : no extremity edema [Nausea] : no nausea [Change In The Stool] : no change in stool [Skin: A Rash] : rash: [Skin Lesions] : skin lesion(s):

## 2018-12-21 NOTE — REASON FOR VISIT
[Follow-Up - Clinic] : a clinic follow-up of [FreeTextEntry2] : Hypertension (Stable; Chronic). Hyperlipidemia (Stable; Chronic). S/P AVR/LVOT (Stable; Chronic). Patient-Risk (High).

## 2018-12-21 NOTE — PHYSICAL EXAM
[General Appearance - Well Developed] : well developed [Normal Appearance] : normal appearance [Well Groomed] : well groomed [General Appearance - Well Nourished] : well nourished [No Deformities] : no deformities [General Appearance - In No Acute Distress] : no acute distress [Normal Conjunctiva] : the conjunctiva exhibited no abnormalities [Eyelids - No Xanthelasma] : the eyelids demonstrated no xanthelasmas [Normal Oral Mucosa] : normal oral mucosa [No Oral Pallor] : no oral pallor [No Oral Cyanosis] : no oral cyanosis [Normal Jugular Venous A Waves Present] : normal jugular venous A waves present [Normal Jugular Venous V Waves Present] : normal jugular venous V waves present [No Jugular Venous Anthony A Waves] : no jugular venous anthony A waves [Respiration, Rhythm And Depth] : normal respiratory rhythm and effort [Exaggerated Use Of Accessory Muscles For Inspiration] : no accessory muscle use [Auscultation Breath Sounds / Voice Sounds] : lungs were clear to auscultation bilaterally [Heart Rate And Rhythm] : heart rate and rhythm were normal [Heart Sounds] : normal S1 and S2 [Abdomen Soft] : soft [Abdomen Tenderness] : non-tender [Abdomen Mass (___ Cm)] : no abdominal mass palpated [Abnormal Walk] : normal gait [Gait - Sufficient For Exercise Testing] : the gait was sufficient for exercise testing [Nail Clubbing] : no clubbing of the fingernails [Cyanosis, Localized] : no localized cyanosis [Petechial Hemorrhages (___cm)] : no petechial hemorrhages [Skin Color & Pigmentation] : normal skin color and pigmentation [] : no rash [No Skin Ulcers] : no skin ulcer [No Xanthoma] : no  xanthoma was observed [Oriented To Time, Place, And Person] : oriented to person, place, and time [Affect] : the affect was normal [Mood] : the mood was normal [No Anxiety] : not feeling anxious [FreeTextEntry1] : +venous stasis

## 2018-12-21 NOTE — DISCUSSION/SUMMARY
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 Osteoarthritis, HTN, HL, Carotid Disease, AS s/p AVR, a longstanding RBBB and ultimately 2:1 block with SOB that prompted PPM BiV placement at [place] in 06/2017, as well as Significant LVOT obstruction with KATIE on Lopressor BID, CAD (d L Main 40%; Ostial LAD 50%) - p/f follow up\par --------------------\par 1. LVOT Obstruction/SOB\par - Dynamic TTE assessments to continue - yearly - today - prior to next visit\par - Continue Lopressor to 50 BID\par - Continue off Lisinopril for now\par - For SOB, will consider MPI but only if worsens; actually improved. \par \par 2. SSS s/p BiV PPM/ECG abnormalities\par - Guerrero. \par '\par 3. Ulcers\par - Improved with help of Vascular - MELVIN/PVR ordered\par \par 4. CAD with 40% L Main/Foot Ulcers\par - ASA indefinitely \par - Reduce ASA to 81\par - Following with Vascular for ulcers (Normal MELVIN). Advised compression stockings\par - Continiue Lopresor 50 BID. \par - Stable angina. \par \par \par \par \par \par Hypertension (Stable; Chronic). Hyperlipidemia (Stable; Chronic). Coronary Artery Disease (Stable; Chronic). (Stable; Chronic). Medication plan for these conditions noted above. \par Total Time Spent in face-to-face encounter was 45 minutes. >50% time spent in counseling and coordination of care and on addressing above medical conditions in assessment.\par All labs, imaging, consulting reports, and any relevant outside records including laboratory work personally reviewed in order to evaluate, manage, and coordinate care amongst providers. Patient-Risk (High).\par Discussed risk factor reduction and lifestyle modification.\par

## 2018-12-21 NOTE — HISTORY OF PRESENT ILLNESS
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 Osteoarthritis,  HTN, HL, Carotid Disease, AS s/p AVR, a longstanding RBBB and ultimately 2:1 block with SOB that prompted PPM BiV placement at [place] in 06/2017, as well as Significant LVOT obstruction with KATIE on Lopressor BID, CAD (d L Main 40%; Ostial LAD 50%) - p/f follow up\par --------------------\par 08/2017\par Unfortunately patient recently syncopized.\par A PPM BiVwas placed at [place]. She thinks was HR related; as reportedly a 2:1 block at that time, following the syncopal episode.\par However, her LVOT obstruction persists. On Lopressor.  \par PPM Placed. \par ---------------------\par 09/2017\par On her last visit, given her persistent LVOT obstruction, her Lopressor was increased to 50 BID. Her Lisinopril was discontinued. She was referred to vascular. \par Since then, she reports  falling and black stools. However, was on NSAIDs and when stopped these, was better. \par She was advised to reduce salt intake and reduce weight. \par Prefers timed release.\par Occasional CP. stable angina\par --------------------\par 11/2017\par Wants to enroll in CR, but slightly volume up. ECG today with unclear AV programmed delay. Otherwise reports doing well. \par ---------------------\par 12/2017\par Expressed appreciation for care. Came with niece.\par However, still not taking diuretic because she strongly dislikes having to go to the bathroom several times\par ---------------------\par 01/2018\par Intermittenly taking Lasix. But is taking it.\par Reports that no longer has anyone following her for PPM. \par ---------------------\par 04/2018\par Much improved. \par PPM - Seeing Dr. Masters\par Reports Lasix use - PRN\par Saw vascular for foot ulcers with normal MELVIN.\par SOB improved. \par HTN/HL/CAD/AS - controlled on current treatment,\par No CP/SOB/Palps/LE Edema/Orthopnea attributable to these medical issues.\par History obtained from patient and pertinent family members. Outside records reviewed. Labs reviewed from EMR.\par -------------------\par 07/2018\par On last visit, was doing well from a pedal edema standpoint. Ulcers on feet improved.  SOB Improved. \par Hypertension/Hyperlipidemia/Coronary Artery Disease - controlled on current treatment,\par No CP/SOB/Palps/LE Edema/Orthopnea attributable to these medical issues.\par Labs reviewed in EMR.\par -------------------\par 12/2018\par Been diagnosed with pemphigus vulgaris.\par Planning to go on cruise, but concerned about leg rash.\par Otherwise feeling well.\par Hypertension/Hyperlipidemia/Coronary Artery Disease - controlled on current treatment,\par Labs reviewed in EMR.\par -------------------\par /2018\par \par \par \par \par \par \par \par \par \par \par \par ---------------------------------------\par Data: \par \par Cr 0.90\par TSH normal\par A1c 6.1\par \par TTE 2016:\par Hyperdynamic LV. \par Mild MR\par Mild MS\par BioAVR\par \par LHC 2012:\par d L Main 40%\par Ostial 50%\par

## 2019-01-22 ENCOUNTER — APPOINTMENT (OUTPATIENT)
Dept: CARDIOLOGY | Facility: CLINIC | Age: 84
End: 2019-01-22

## 2019-01-22 NOTE — DISCUSSION/SUMMARY
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 Osteoarthritis, HTN, HL, Carotid Disease, AS s/p AVR, a longstanding RBBB and ultimately 2:1 block with SOB that prompted PPM BiV placement at [place] in 06/2017, as well as Significant LVOT obstruction with KATIE on Lopressor BID, CAD (d L Main 40%; Ostial LAD 50%) - p/f follow up\par --------------------\par 1. LVOT Obstruction/SOB\par - Dynamic TTE assessments to continue - yearly\par - Continue Lopressor to 50 BID\par - Continue off Lisinopril for now\par - For SOB, will consider MPI but only if worsens; actually improved. \par \par 2. SSS s/p BiV PPM/ECG abnormalities\par - Guerrero. \par '\par 3. Ulcers\par - Improved with help of Vascular - MELVIN/PVR ordered\par \par 4. CAD with 40% L Main/Foot Ulcers\par - ASA indefinitely \par - Reduce ASA to 81\par - Following with Vascular for ulcers (Normal MELVIN). Advised compression stockings\par - Continiue Lopresor 50 BID. \par - Stable angina. \par \par \par \par \par \par Hypertension (Stable; Chronic). Hyperlipidemia (Stable; Chronic). Coronary Artery Disease (Stable; Chronic). (Stable; Chronic). Medication plan for these conditions noted above. \par Total Time Spent in face-to-face encounter was 45 minutes. >50% time spent in counseling and coordination of care and on addressing above medical conditions in assessment.\par All labs, imaging, consulting reports, and any relevant outside records including laboratory work personally reviewed in order to evaluate, manage, and coordinate care amongst providers. Patient-Risk (High).\par Discussed risk factor reduction and lifestyle modification.\par

## 2019-01-22 NOTE — HISTORY OF PRESENT ILLNESS
[FreeTextEntry1] : CHART REVIEWED. Patient did not make appointment. 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 Osteoarthritis,  HTN, HL, Carotid Disease, AS s/p AVR, a longstanding RBBB and ultimately 2:1 block with SOB that prompted PPM BiV placement at [facility] in 06/2017, as well as Significant LVOT obstruction with KATIE on Lopressor BID, CAD (d L Main 40%; Ostial LAD 50%) - p/f follow up\par --------------------\par 08/2017\par Unfortunately patient recently syncopized.\par A PPM BiVwas placed at [facility]. She thinks was HR related; as reportedly a 2:1 block at that time, following the syncopal episode.\par However, her LVOT obstruction persists. On Lopressor.  \par PPM Placed. \par ---------------------\par 09/2017\par On her last visit, given her persistent LVOT obstruction, her Lopressor was increased to 50 BID. Her Lisinopril was discontinued. She was referred to vascular. \par Since then, she reports  falling and black stools. However, was on NSAIDs and when stopped these, was better. \par She was advised to reduce salt intake and reduce weight. \par Prefers timed release.\par Occasional CP. stable angina\par --------------------\par 11/2017\par Wants to enroll in CR, but slightly volume up. ECG today with unclear AV programmed delay. Otherwise reports doing well. \par ---------------------\par 12/2017\par Expressed appreciation for care. Came with niece.\par However, still not taking diuretic because she strongly dislikes having to go to the bathroom several times\par ---------------------\par 01/2018\par Intermittenly taking Lasix. But is taking it.\par Reports that no longer has anyone following her for PPM. \par ---------------------\par 04/2018\par Much improved. \par PPM - Seeing Dr. Masters\par Reports Lasix use - PRN\par Saw vascular for foot ulcers with normal MELVIN.\par SOB improved. \par HTN/HL/CAD/AS - controlled on current treatment,\par No CP/SOB/Palps/LE Edema/Orthopnea attributable to these medical issues.\par History obtained from patient and pertinent family members. Outside records reviewed. Labs reviewed from EMR.\par -------------------\par 07/2018\par On last visit, was doing well from a pedal edema standpoint. Ulcers on feet improved.  SOB Improved. \par Hypertension/Hyperlipidemia/Coronary Artery Disease - controlled on current treatment,\par No CP/SOB/Palps/LE Edema/Orthopnea attributable to these medical issues.\par Labs reviewed in EMR.\par -------------------\par 12/2018\par Been diagnosed with pemphigus vulgaris.\par Planning to go on cruise, but concerned about leg rash.\par Otherwise feeling well.\par Hypertension/Hyperlipidemia/Coronary Artery Disease - controlled on current treatment,\par Labs reviewed in EMR.\par -------------------\par 01/2019\par Recently went on Cruise and concerned about leg rash from pemphis vulgaris. \par Hypertension/Hyperlipidemia/Coronary Artery Disease - controlled on current treatment,\par Labs reviewed in EMR.\par -------------------\par /2019\par \par \par \par \par \par \par \par \par \par \par \par \par \par \par \par ---------------------------------------\par Data: \par \par Cr 0.90\par TSH normal\par A1c 6.1\par \par TTE 2016:\par Hyperdynamic LV. \par Mild MR\par Mild MS\par BioAVR\par \par LHC 2012:\par d L Main 40%\par Ostial 50%\par

## 2019-01-22 NOTE — REVIEW OF SYSTEMS
[Shortness Of Breath] : no shortness of breath [Dyspnea on exertion] : not dyspnea during exertion [Chest Pain] : no chest pain [Lower Ext Edema] : no extremity edema [Nausea] : no nausea [Change In The Stool] : no change in stool

## 2019-01-22 NOTE — REASON FOR VISIT
[FreeTextEntry2] : Hypertension (Stable; Chronic). Hyperlipidemia (Stable; Chronic). Coronary Artery Disease (Stable; Chronic). Patient-Risk (High).

## 2019-01-24 ENCOUNTER — APPOINTMENT (OUTPATIENT)
Dept: CARDIOLOGY | Facility: CLINIC | Age: 84
End: 2019-01-24
Payer: MEDICARE

## 2019-01-24 VITALS
HEIGHT: 62.5 IN | DIASTOLIC BLOOD PRESSURE: 80 MMHG | OXYGEN SATURATION: 96 % | SYSTOLIC BLOOD PRESSURE: 140 MMHG | HEART RATE: 69 BPM | BODY MASS INDEX: 34.99 KG/M2 | WEIGHT: 195 LBS

## 2019-01-24 PROCEDURE — 99214 OFFICE O/P EST MOD 30 MIN: CPT

## 2019-01-25 NOTE — OBJECTIVE
[History reviewed] : History reviewed. [Medications and Allergies reviewed] : Medications and allergies reviewed. Surgeon: Mary Mathews MD

## 2019-01-25 NOTE — HISTORY OF PRESENT ILLNESS
[FreeTextEntry1] : 86 years old with history of HTN, AS s/p SAVR, recent PPM.  \par \par Followup visit 1/25/2019\par Doing ok.  still with leg swelling. No wearing compression.  Took <500 steps today.  Bought an elevtric wheelchair and is using this to get around.  Went to [place] last month - no issues.  Not using a pneumatic pump for leg swelling.  Accompanied by her family member.  \par \par Followup visit 8/9/2018\par Complains of leg swelling.  has not worn compression stocking.  Not using her pneumatic pump .  Using lacyhydrin.  Breathing is okay.  No CP .  No palptiations.  non complaint with salt intake.  \par \par Jan 11, 2018 Followup visit\par Not wearing compression.  Has a pneumatic pump at home, not using.  she is using lachydrin daily.  No ulceration.  Feet and ankles are swollen.  Here with niece.  no CP or SOB.  Seen by Dr. Riley several days ago.  No other issues.  Hesitant to wear compression.  \par \par \par \par Oct 4, 2017 Followup:\par \par \par Patient is here for followup visit.  She underwent MELVIN testing which revealed normal MELVIN's bilaterally.\par She does get leg swelling still that has been ongoing.  She has not tried compression, but states that the leg swelling improves with elevation.  Her legs are painful rates it as 6/10 pain, relievfed with elevation.  Notes hyperpigmentaiton in that area.  She had an ulceration on her R shin, which has healed.\par \par Otherwise no CP or shortness of breath.  her HTN is controlled on current regimen. She sees Sumit Riley for Caridology\par \par She has not yet seen a podiatrist, but I referred her to Dr. Lentz for evaluation.

## 2019-01-25 NOTE — PHYSICAL EXAM
[General Appearance - Well Developed] : well developed [Normal Appearance] : normal appearance [Well Groomed] : well groomed [General Appearance - Well Nourished] : well nourished [No Deformities] : no deformities [General Appearance - In No Acute Distress] : no acute distress [Normal Conjunctiva] : the conjunctiva exhibited no abnormalities [Eyelids - No Xanthelasma] : the eyelids demonstrated no xanthelasmas [Normal Oral Mucosa] : normal oral mucosa [No Oral Pallor] : no oral pallor [No Oral Cyanosis] : no oral cyanosis [Normal Jugular Venous A Waves Present] : normal jugular venous A waves present [Normal Jugular Venous V Waves Present] : normal jugular venous V waves present [No Jugular Venous Anthony A Waves] : no jugular venous anthony A waves [Respiration, Rhythm And Depth] : normal respiratory rhythm and effort [Exaggerated Use Of Accessory Muscles For Inspiration] : no accessory muscle use [Auscultation Breath Sounds / Voice Sounds] : lungs were clear to auscultation bilaterally [Heart Rate And Rhythm] : heart rate and rhythm were normal [Heart Sounds] : normal S1 and S2 [Murmurs] : no murmurs present [Abdomen Soft] : soft [Abdomen Tenderness] : non-tender [Abdomen Mass (___ Cm)] : no abdominal mass palpated [Nail Clubbing] : no clubbing of the fingernails [Cyanosis, Localized] : no localized cyanosis [Petechial Hemorrhages (___cm)] : no petechial hemorrhages [] : no ischemic changes [FreeTextEntry1] : some venous stasis hyperpigmentation.  [Oriented To Time, Place, And Person] : oriented to person, place, and time [Affect] : the affect was normal [Mood] : the mood was normal [No Anxiety] : not feeling anxious

## 2019-01-25 NOTE — DISCUSSION/SUMMARY
[FreeTextEntry1] : Assessment:\par 1.  Left leg ulcer\par - healed\par - edema of the legs poorly controlled\par - not wearing compression daily, not compliant with diuretic\par 2.  L foot pain\par  3.  HLD, HTN - controlled \par 4.  AS/  s/p SAVR - asympomatic. \par \par \par Plan:.\par 1.  Continue with Lac-Hydrin lotion BID to both legs \par 2.  Again we discussed the importance  of compression\par 3   stressed the importance of compression and risk of ulceration recurrence.\par 4  Return in 2 months\par 5.  She needs to be complaint with her diuretic\par 6.  Perhaps she can start using her pneumatic pump if she would like. \par 7. Increase walking and activity.  She should use the electric wheelchair as little as possible.  Consider aqua therapy or swimming \par \par \par Thanks,\par \par Tanner Moulton\par Vascular Medicine

## 2019-03-05 ENCOUNTER — APPOINTMENT (OUTPATIENT)
Dept: CARDIOLOGY | Facility: CLINIC | Age: 84
End: 2019-03-05
Payer: MEDICARE

## 2019-04-02 ENCOUNTER — APPOINTMENT (OUTPATIENT)
Dept: CARDIOLOGY | Facility: CLINIC | Age: 84
End: 2019-04-02
Payer: MEDICARE

## 2019-04-02 ENCOUNTER — NON-APPOINTMENT (OUTPATIENT)
Age: 84
End: 2019-04-02

## 2019-04-02 VITALS
HEART RATE: 81 BPM | SYSTOLIC BLOOD PRESSURE: 162 MMHG | OXYGEN SATURATION: 97 % | DIASTOLIC BLOOD PRESSURE: 85 MMHG | BODY MASS INDEX: 35.88 KG/M2 | WEIGHT: 195 LBS | HEIGHT: 62 IN

## 2019-04-02 PROCEDURE — 99215 OFFICE O/P EST HI 40 MIN: CPT

## 2019-04-02 PROCEDURE — 93000 ELECTROCARDIOGRAM COMPLETE: CPT

## 2019-07-16 ENCOUNTER — APPOINTMENT (OUTPATIENT)
Dept: CARDIOLOGY | Facility: CLINIC | Age: 84
End: 2019-07-16
Payer: MEDICARE

## 2019-07-16 ENCOUNTER — NON-APPOINTMENT (OUTPATIENT)
Age: 84
End: 2019-07-16

## 2019-07-16 VITALS
DIASTOLIC BLOOD PRESSURE: 76 MMHG | WEIGHT: 192 LBS | OXYGEN SATURATION: 96 % | SYSTOLIC BLOOD PRESSURE: 145 MMHG | HEIGHT: 62 IN | HEART RATE: 76 BPM | BODY MASS INDEX: 35.33 KG/M2

## 2019-07-16 DIAGNOSIS — I65.29 OCCLUSION AND STENOSIS OF UNSPECIFIED CAROTID ARTERY: ICD-10-CM

## 2019-07-16 DIAGNOSIS — I48.91 UNSPECIFIED ATRIAL FIBRILLATION: ICD-10-CM

## 2019-07-16 DIAGNOSIS — R60.0 LOCALIZED EDEMA: ICD-10-CM

## 2019-07-16 PROCEDURE — 93000 ELECTROCARDIOGRAM COMPLETE: CPT

## 2019-07-16 PROCEDURE — 99215 OFFICE O/P EST HI 40 MIN: CPT

## 2019-07-17 PROBLEM — I48.91 ATRIAL FIBRILLATION: Status: ACTIVE | Noted: 2018-02-14

## 2019-07-18 LAB
24R-OH-CALCIDIOL SERPL-MCNC: 41.1 PG/ML
ALBUMIN SERPL ELPH-MCNC: 4 G/DL
ALP BLD-CCNC: 98 U/L
ALT SERPL-CCNC: 20 U/L
ANION GAP SERPL CALC-SCNC: 11 MMOL/L
AST SERPL-CCNC: 22 U/L
BASOPHILS # BLD AUTO: 0.05 K/UL
BASOPHILS NFR BLD AUTO: 0.6 %
BILIRUB SERPL-MCNC: 0.4 MG/DL
BUN SERPL-MCNC: 24 MG/DL
CALCIUM SERPL-MCNC: 9.7 MG/DL
CHLORIDE SERPL-SCNC: 107 MMOL/L
CHOLEST SERPL-MCNC: 184 MG/DL
CHOLEST/HDLC SERPL: 2.4 RATIO
CO2 SERPL-SCNC: 26 MMOL/L
CREAT SERPL-MCNC: 1.05 MG/DL
EOSINOPHIL # BLD AUTO: 0.31 K/UL
EOSINOPHIL NFR BLD AUTO: 3.5 %
ESTIMATED AVERAGE GLUCOSE: 120 MG/DL
GLUCOSE SERPL-MCNC: 107 MG/DL
HBA1C MFR BLD HPLC: 5.8 %
HCT VFR BLD CALC: 37 %
HDLC SERPL-MCNC: 76 MG/DL
HGB BLD-MCNC: 11.8 G/DL
IMM GRANULOCYTES NFR BLD AUTO: 0.2 %
LDLC SERPL CALC-MCNC: 95 MG/DL
LYMPHOCYTES # BLD AUTO: 1.63 K/UL
LYMPHOCYTES NFR BLD AUTO: 18.2 %
MAN DIFF?: NORMAL
MCHC RBC-ENTMCNC: 30.3 PG
MCHC RBC-ENTMCNC: 31.9 GM/DL
MCV RBC AUTO: 95.1 FL
MONOCYTES # BLD AUTO: 0.82 K/UL
MONOCYTES NFR BLD AUTO: 9.2 %
NEUTROPHILS # BLD AUTO: 6.13 K/UL
NEUTROPHILS NFR BLD AUTO: 68.3 %
NT-PROBNP SERPL-MCNC: 623 PG/ML
PLATELET # BLD AUTO: 221 K/UL
POTASSIUM SERPL-SCNC: 5.1 MMOL/L
PROT SERPL-MCNC: 6.8 G/DL
RBC # BLD: 3.89 M/UL
RBC # FLD: 13.2 %
SODIUM SERPL-SCNC: 144 MMOL/L
TRIGL SERPL-MCNC: 67 MG/DL
TSH SERPL-ACNC: 3.27 UIU/ML
WBC # FLD AUTO: 8.96 K/UL

## 2019-08-30 NOTE — PATIENT PROFILE ADULT. - HARM RISK FACTORS
No outpatient medications have been marked as taking for the 8/30/19 encounter (Telephone) with Catracho Ayon MD.     Dad stopped in stating he got a voicemail that he needs to set up and apt with Kathy Gonzalez.  Dad would like to speak to a nurse about this apt and how necessary it is.  Dad also states mom got a call first with test results and he is primary on the account and never got a call regarding results just a separate call to set up an apt.  He would like to discuss result and further steps.    Please call rashida at [phone] to discuss   yes

## 2020-02-12 ENCOUNTER — APPOINTMENT (OUTPATIENT)
Dept: CARDIOLOGY | Facility: CLINIC | Age: 85
End: 2020-02-12

## 2020-06-17 ENCOUNTER — APPOINTMENT (OUTPATIENT)
Dept: CARDIOLOGY | Facility: CLINIC | Age: 85
End: 2020-06-17
Payer: MEDICARE

## 2020-06-17 ENCOUNTER — NON-APPOINTMENT (OUTPATIENT)
Age: 85
End: 2020-06-17

## 2020-06-17 VITALS
SYSTOLIC BLOOD PRESSURE: 125 MMHG | WEIGHT: 197 LBS | DIASTOLIC BLOOD PRESSURE: 71 MMHG | BODY MASS INDEX: 36.03 KG/M2 | OXYGEN SATURATION: 97 % | HEART RATE: 83 BPM

## 2020-06-17 PROCEDURE — 93000 ELECTROCARDIOGRAM COMPLETE: CPT

## 2020-06-17 PROCEDURE — 99215 OFFICE O/P EST HI 40 MIN: CPT

## 2020-09-22 ENCOUNTER — NON-APPOINTMENT (OUTPATIENT)
Age: 85
End: 2020-09-22

## 2020-09-22 ENCOUNTER — APPOINTMENT (OUTPATIENT)
Dept: CARDIOLOGY | Facility: CLINIC | Age: 85
End: 2020-09-22
Payer: MEDICARE

## 2020-09-22 VITALS
SYSTOLIC BLOOD PRESSURE: 146 MMHG | DIASTOLIC BLOOD PRESSURE: 78 MMHG | OXYGEN SATURATION: 98 % | BODY MASS INDEX: 33.13 KG/M2 | HEART RATE: 92 BPM | HEIGHT: 62 IN | WEIGHT: 180 LBS

## 2020-09-22 PROCEDURE — 99215 OFFICE O/P EST HI 40 MIN: CPT

## 2020-09-22 PROCEDURE — 93000 ELECTROCARDIOGRAM COMPLETE: CPT

## 2020-09-24 LAB
ALBUMIN SERPL ELPH-MCNC: 4.1 G/DL
ALP BLD-CCNC: 246 U/L
ALT SERPL-CCNC: 131 U/L
ANION GAP SERPL CALC-SCNC: 12 MMOL/L
AST SERPL-CCNC: 168 U/L
BASOPHILS # BLD AUTO: 0.03 K/UL
BASOPHILS NFR BLD AUTO: 0.4 %
BILIRUB SERPL-MCNC: 0.5 MG/DL
BUN SERPL-MCNC: 19 MG/DL
CALCIUM SERPL-MCNC: 9.6 MG/DL
CHLORIDE SERPL-SCNC: 103 MMOL/L
CHOLEST SERPL-MCNC: 170 MG/DL
CHOLEST/HDLC SERPL: 2.2 RATIO
CO2 SERPL-SCNC: 24 MMOL/L
CREAT SERPL-MCNC: 1.16 MG/DL
EOSINOPHIL # BLD AUTO: 0.15 K/UL
EOSINOPHIL NFR BLD AUTO: 1.9 %
ESTIMATED AVERAGE GLUCOSE: 123 MG/DL
GLUCOSE SERPL-MCNC: 107 MG/DL
HBA1C MFR BLD HPLC: 5.9 %
HCT VFR BLD CALC: 35.3 %
HDLC SERPL-MCNC: 78 MG/DL
HGB BLD-MCNC: 10.9 G/DL
IMM GRANULOCYTES NFR BLD AUTO: 0.4 %
LDLC SERPL CALC-MCNC: 79 MG/DL
LYMPHOCYTES # BLD AUTO: 1.01 K/UL
LYMPHOCYTES NFR BLD AUTO: 13 %
MAN DIFF?: NORMAL
MCHC RBC-ENTMCNC: 29.5 PG
MCHC RBC-ENTMCNC: 30.9 GM/DL
MCV RBC AUTO: 95.7 FL
MONOCYTES # BLD AUTO: 0.79 K/UL
MONOCYTES NFR BLD AUTO: 10.2 %
NEUTROPHILS # BLD AUTO: 5.74 K/UL
NEUTROPHILS NFR BLD AUTO: 74.1 %
PLATELET # BLD AUTO: 262 K/UL
POTASSIUM SERPL-SCNC: 5.1 MMOL/L
PROT SERPL-MCNC: 7 G/DL
RBC # BLD: 3.69 M/UL
RBC # FLD: 14.5 %
SODIUM SERPL-SCNC: 139 MMOL/L
TRIGL SERPL-MCNC: 68 MG/DL
TSH SERPL-ACNC: 4.51 UIU/ML
WBC # FLD AUTO: 7.75 K/UL

## 2020-09-25 ENCOUNTER — APPOINTMENT (OUTPATIENT)
Dept: CARDIOLOGY | Facility: CLINIC | Age: 85
End: 2020-09-25
Payer: MEDICARE

## 2020-09-25 DIAGNOSIS — I10 ESSENTIAL (PRIMARY) HYPERTENSION: ICD-10-CM

## 2020-09-25 DIAGNOSIS — I25.10 ATHEROSCLEROTIC HEART DISEASE OF NATIVE CORONARY ARTERY W/OUT ANGINA PECTORIS: ICD-10-CM

## 2020-09-25 DIAGNOSIS — E78.5 HYPERLIPIDEMIA, UNSPECIFIED: ICD-10-CM

## 2020-09-25 LAB — NT-PROBNP SERPL-MCNC: 751 PG/ML

## 2020-09-25 PROCEDURE — 99443: CPT | Mod: 95

## 2021-03-10 ENCOUNTER — APPOINTMENT (OUTPATIENT)
Dept: CARDIOLOGY | Facility: CLINIC | Age: 86
End: 2021-03-10

## 2023-10-09 NOTE — ED ADULT NURSE NOTE - EXTENSIONS OF SELF_ADULT
None Excisional Biopsy Additional Text (Leave Blank If You Do Not Want): The margin was drawn around the clinically apparent lesion. An elliptical shape was then drawn on the skin incorporating the lesion and margins.  Incisions were then made along these lines to the appropriate tissue plane and the lesion was extirpated.

## 2024-05-08 NOTE — PROVIDER CONTACT NOTE (OTHER) - RECOMMENDATIONS
Assess patient. Review patient's orders and lab results. Ordered 12-Lead ECG, complete Labs Cardiac Enzymes, O2 Nasal Cannula 2 Liters and medication if necessary.
Attending with

## 2025-03-12 NOTE — ED ADULT NURSE NOTE - PMH
[NS_DeliveryAttending1_OBGYN_ALL_OB_FT:[id number]],[NS_DeliveryAssist1_OBGYN_ALL_OB_FT:[id number]],[NS_DeliveryRN_OBGYN_ALL_OB_FT:[id number]]
CAD (coronary artery disease)    HLD (hyperlipidemia)    Hypertension

## 2025-06-24 NOTE — H&P ADULT. - SOURCE OF INFORMATION, PROFILE
Last visit: 5.9.25  Last Med refill: 6.3.25  Does patient have enough medication for 72 hours: Yes    Next Visit Date:  Future Appointments   Date Time Provider Department Center   7/17/2025 10:00 AM Mio Foley MD [facility] DEP       Health Maintenance   Topic Date Due    COVID-19 Vaccine (1) Never done    Diabetic retinal exam  Never done    Colorectal Cancer Screen  Never done    Lipids  01/09/2024    Hepatitis B vaccine (4 of 4 - Risk Dialysis Recombivax 3-dose series) 01/27/2024    Diabetic foot exam  05/04/2024    Respiratory Syncytial Virus (RSV) Pregnant or age 60 yrs+ (1 - Risk 60-74 years 1-dose series) Never done    Depression Screen  08/23/2025    DTaP/Tdap/Td vaccine (2 - Td or Tdap) 11/11/2025    A1C test (Diabetic or Prediabetic)  05/09/2026    Flu vaccine  Completed    Shingles vaccine  Completed    Pneumococcal 50+ years Vaccine  Completed    Hepatitis C screen  Completed    HIV screen  Completed    Hepatitis A vaccine  Aged Out    Hib vaccine  Aged Out    Polio vaccine  Aged Out    Meningococcal (ACWY) vaccine  Aged Out    Meningococcal B vaccine  Aged Out    Diabetic Alb to Cr ratio (uACR) test  Discontinued    GFR test (Diabetes, CKD 3-4, OR last GFR 15-59)  Discontinued    Pneumococcal 0-49 years Vaccine  Discontinued    Lung Cancer Screening &/or Counseling  Discontinued       Hemoglobin A1C (%)   Date Value   05/09/2025 7.1   10/25/2024 7.4   08/23/2024 10.0             ( goal A1C is < 7)   No components found for: \"LABMICR\"  No components found for: \"LDLCHOLESTEROL\", \"LDLCALC\"    (goal LDL is <100)   AST (U/L)   Date Value   09/06/2023 66 (H)     ALT (U/L)   Date Value   09/06/2023 32     BUN (mg/dL)   Date Value   05/09/2025 26 (H)     BP Readings from Last 3 Encounters:   05/09/25 (!) 170/70   10/25/24 (!) 151/69   09/26/24 (!) 136/59          (goal 120/80)    All Future Testing planned in [facility]  Lab Frequency Next Occurrence   [facility] - Vascular duplex carotid bilateral Once  Daughter, Roxann Brantley/patient/family